# Patient Record
Sex: MALE | Race: WHITE | NOT HISPANIC OR LATINO | Employment: FULL TIME | URBAN - METROPOLITAN AREA
[De-identification: names, ages, dates, MRNs, and addresses within clinical notes are randomized per-mention and may not be internally consistent; named-entity substitution may affect disease eponyms.]

---

## 2024-11-03 ENCOUNTER — HOSPITAL ENCOUNTER (EMERGENCY)
Facility: HOSPITAL | Age: 19
Discharge: HOME | End: 2024-11-03
Payer: MEDICARE

## 2024-11-03 VITALS
OXYGEN SATURATION: 99 % | BODY MASS INDEX: 23.62 KG/M2 | WEIGHT: 165 LBS | DIASTOLIC BLOOD PRESSURE: 89 MMHG | HEART RATE: 67 BPM | SYSTOLIC BLOOD PRESSURE: 145 MMHG | HEIGHT: 70 IN | RESPIRATION RATE: 16 BRPM | TEMPERATURE: 99.3 F

## 2024-11-03 DIAGNOSIS — H10.33 ACUTE BACTERIAL CONJUNCTIVITIS OF BOTH EYES: Primary | ICD-10-CM

## 2024-11-03 PROCEDURE — 99283 EMERGENCY DEPT VISIT LOW MDM: CPT

## 2024-11-03 RX ORDER — CIPROFLOXACIN HYDROCHLORIDE 3 MG/ML
1 SOLUTION/ DROPS OPHTHALMIC EVERY 4 HOURS
Qty: 10 ML | Refills: 0 | Status: SHIPPED | OUTPATIENT
Start: 2024-11-03 | End: 2024-11-10

## 2024-11-03 ASSESSMENT — PAIN SCALES - GENERAL: PAINLEVEL_OUTOF10: 8

## 2024-11-03 ASSESSMENT — PAIN DESCRIPTION - LOCATION: LOCATION: EYE

## 2024-11-03 ASSESSMENT — PAIN DESCRIPTION - DESCRIPTORS: DESCRIPTORS: BURNING

## 2024-11-03 ASSESSMENT — PAIN - FUNCTIONAL ASSESSMENT: PAIN_FUNCTIONAL_ASSESSMENT: 0-10

## 2024-11-03 NOTE — ED PROVIDER NOTES
HPI   Chief Complaint   Patient presents with    Eye Drainage         History provided by:  Patient   used: No      19-year-old male presents the ED for evaluation of bilateral eye concern.  Patient states that his right eye was initially red, itchy with drainage.  He states that he was itching it and then touched other eye and now his left eye has the same symptoms.  He denies any painful vision loss or pressure to the eye.  Denies any foreign body or trauma noted.  Does not wear contacts.  Denies any additional symptoms or complaints at this time.    ROS is otherwise negative unless stated above.      Patient History   No past medical history on file.  No past surgical history on file.  No family history on file.  Social History     Tobacco Use    Smoking status: Not on file    Smokeless tobacco: Not on file   Substance Use Topics    Alcohol use: Not on file    Drug use: Not on file       Physical Exam   ED Triage Vitals [11/03/24 1205]   Temperature Heart Rate Respirations BP   37.4 °C (99.3 °F) 67 16 145/89      SpO2 Temp src Heart Rate Source Patient Position   -- -- -- --      BP Location FiO2 (%)     -- --       Physical Exam  VS: As documented in the triage note and EMR flowsheet from this visit were reviewed.    GEN: NAD, nontoxic, well-appearing, ambulates without difficulty  EYES:  EOMs grossly intact, anicteric sclera, no nystagmus noted, bilateral eyes with injected conjunctiva and purulent drainage, no foreign body noted; no evidence of trauma noted  HEENT: Airway patent  SKIN: Skin normal color for race, warm, dry and intact. No evidence of trauma. No rash noted.  NEURO: Alert and oriented x 3, speech is clear, no obvious deficits noted    ED Course & MDM   Diagnoses as of 11/03/24 1212   Acute bacterial conjunctivitis of both eyes                 No data recorded                                 Medical Decision Making  upon assessment patient was a healthy non-toxic appearing male  in no apparent distress. Patient was able to ambulate independently without difficulty or dyspnea.  Patient is neuro intact, no focal neuro deficits noted, moving all extremities without difficulty.  Vision intact.  bilateral eye with yellow drainage and erythema.  No foreign body noted.  No evidence of trauma noted. See physical exam section for my assessment.  Physical exam concerning for but not limited to bacterial conjunctivitis versus viral/allergic conjunctivitis.  Due to prior history and current physical exam, no indication for work up.  Patient remained hemodynamically stable throughout ED visit.  Previous consult/ED visit notes were reviewed. Findings were discussed with patient and patient agreeable for plan to discharge home with PCP ( provided referral) follow-up in 1 week for further management and to continue tylenol PO and ibuprofen PO for pain as needed. Prior medications reviewed and Patient prescribed ciprofloxacin eye drops for treatment.  educated to avoid rubbing eyes and on signs and symptoms of infection/worsening trauma.   Educated on additional symptomatic and supportive care at home.    Return precautions discussed and patient acknowledged understanding.  All questions and concerns answered prior to discharge.              *Please note that portions of this note may have been completed with a voice recognition program.  Efforts were made to edit the dictations but occasionally, words are mis-transcribed.    Procedure  Procedures     Maria G Rangel, CHANTELLE-CNP  11/03/24 2938

## 2024-11-03 NOTE — Clinical Note
Alvin Peguero was seen and treated in our emergency department on 11/3/2024.  He may return to work on 11/05/2024.       If you have any questions or concerns, please don't hesitate to call.      Maria G Rangel, APRN-CNP

## 2024-11-03 NOTE — ED TRIAGE NOTES
Patient presents with concern for pinkeye. States he has been experiencing redness, tearing and yellowish drainage that began in right eye and now experiencing symptoms in left eye as well. Symptoms have been ongoing for the last 5 days. Does not wear corrective lenses or contacts. No visual deficits reported